# Patient Record
Sex: MALE | Race: WHITE | NOT HISPANIC OR LATINO | URBAN - METROPOLITAN AREA
[De-identification: names, ages, dates, MRNs, and addresses within clinical notes are randomized per-mention and may not be internally consistent; named-entity substitution may affect disease eponyms.]

---

## 2017-01-20 ENCOUNTER — ALLSCRIPTS OFFICE VISIT (OUTPATIENT)
Dept: OTHER | Facility: OTHER | Age: 72
End: 2017-01-20

## 2017-01-20 DIAGNOSIS — R73.9 HYPERGLYCEMIA: ICD-10-CM

## 2017-01-20 DIAGNOSIS — E55.9 VITAMIN D DEFICIENCY: ICD-10-CM

## 2017-01-20 DIAGNOSIS — E29.1 TESTICULAR HYPOFUNCTION: ICD-10-CM

## 2017-01-24 ENCOUNTER — LAB CONVERSION - ENCOUNTER (OUTPATIENT)
Dept: OTHER | Facility: OTHER | Age: 72
End: 2017-01-24

## 2017-01-24 LAB
25(OH)D3 SERPL-MCNC: 58 NG/ML (ref 30–100)
HBA1C MFR BLD HPLC: 5.3 % OF TOTAL HGB
TESTOSTERONE FREE (HISTORICAL): 8.6 PG/ML (ref 30–135)
TESTOSTERONE TOTAL (HISTORICAL): 148 NG/DL (ref 250–1100)

## 2017-01-26 ENCOUNTER — GENERIC CONVERSION - ENCOUNTER (OUTPATIENT)
Dept: OTHER | Facility: OTHER | Age: 72
End: 2017-01-26

## 2017-07-21 ENCOUNTER — ALLSCRIPTS OFFICE VISIT (OUTPATIENT)
Dept: OTHER | Facility: OTHER | Age: 72
End: 2017-07-21

## 2018-01-09 NOTE — RESULT NOTES
Message   Testosterone Free and Total is low but this was expected with no injections for one month  Verified Results  *(Q) VITAMIN D, 25-HYDROXY, LC/MS/MS 21Jan2017 10:04Jo Ann Nunn     Test Name Result Flag Reference   VITAMIN D, 25-OH, TOTAL 58 ng/mL     Vitamin D Status         25-OH Vitamin D:     Deficiency:                    <20 ng/mL  Insufficiency:             20 - 29 ng/mL  Optimal:                 > or = 30 ng/mL     For 25-OH Vitamin D testing on patients on   D2-supplementation and patients for whom quantitation   of D2 and D3 fractions is required, the QuestAssureD(TM)  25-OH VIT D, (D2,D3), LC/MS/MS is recommended: order   code 31561 (patients >2yrs)  For more information on this test, go to:  http://[employer]/faq/DDO539  (This link is being provided for   informational/educational purposes only )     (Q) TESTOSTERONE, FREE AND TOTAL, LC/MS/MS 21Jan2017 10:04Jo Ann uNnn     Test Name Result Flag Reference   TESTOSTERONE, TOTAL,$LC/MS/ ng/dL L 250-1100   Men with clinically significant hypogonadal  symptoms and testosterone values repeatedly in  the range of the 200-300 ng/dL or less, may  benefit from testosterone treatment after  adequate risk and benefits counseling  For more information on this test, go to  http://[employer]/faq/  TotalTestosteroneLCMSMS   FREE TESTOSTERONE 8 6 pg/mL L 30 0-135 0     (Q) HEMOGLOBIN A1c 21Jan2017 10:04AM Jo Ann Reed   REPORT COMMENT:  FASTING:YES     Test Name Result Flag Reference   HEMOGLOBIN A1c 5 3 % of total Hgb  <5 7   According to ADA guidelines, hemoglobin A1c <7 0%  represents optimal control in non-pregnant diabetic  patients  Different metrics may apply to specific  patient populations  Standards of Medical Care in  [phone]  Diabetes Care   2013;36:s11-s66     For the purpose of screening for the presence of  diabetes  <5 7%       Consistent with the absence of diabetes  5 7-6 4%    Consistent with increased risk for diabetes              (prediabetes)  >or=6 5%    Consistent with diabetes     This assay result is consistent with a decreased risk  of diabetes  Currently, no consensus exists for use of hemoglobin  A1c for diagnosis of diabetes for children

## 2018-01-11 NOTE — RESULT NOTES
Message   A1C 5 7- minimally elevated into prediabetes range  rest of labs oK  Continue current androgel and Vitamin D     Verified Results  (1) PSA (SCREEN) (Dx V76 44 Screen for Prostate Cancer) [id number] 01:15PM Morna      Test Name Result Flag Reference   PSA, TOTAL <0 1 ng/mL  < OR = 4 0   This test was performed using the Siemens  chemiluminescent method  Values obtained from  different assay methods cannot be used  interchangeably  PSA levels, regardless of  value, should not be interpreted as absolute  evidence of the presence or absence of disease  (Q) HEMOGLOBIN + HEMATOCRIT [id number] 01:15PM Morna [place]     Test Name Result Flag Reference   HEMOGLOBIN 14 6 g/dL  13 2-17 1   HEMATOCRIT 43 1 %  38 5-50 0     *(Q) VITAMIN D, 25-HYDROXY, LC/MS/MS [id number] 01:15PM Morna [place]     Test Name Result Flag Reference   VITAMIN D, 25-OH, TOTAL 53 ng/mL     Vitamin D Status         25-OH Vitamin D:     Deficiency:                    <20 ng/mL  Insufficiency:             20 - 29 ng/mL  Optimal:                 > or = 30 ng/mL     For 25-OH Vitamin D testing on patients on   D2-supplementation and patients for whom quantitation   of D2 and D3 fractions is required, the QuestAssureD(TM)  25-OH VIT D, (D2,D3), LC/MS/MS is recommended: order   code 22993 (patients >2yrs)  For more information on this test, go to:  http://education  [employer]faq/FMV952  (This link is being provided for   informational/educational purposes only )     (Q) TESTOSTERONE, FREE AND TOTAL, LC/MS/MS [id number] 01:15PM Morna      Test Name Result Flag Reference   TESTOSTERONE, TOTAL,$LC/MS/ ng/dL  250-1100   Men with clinically significant hypogonadal  symptoms and testosterone values repeatedly in  the range of the 200-300 ng/dL or less, may  benefit from testosterone treatment after  adequate risk and benefits counseling              For more information on this test, go to  http://education  [employer]/faq/  TotalTestosteroneLCMSMS   FREE TESTOSTERONE 30 3 pg/mL  30 0-135 0     (Q) HEMOGLOBIN A1c [id number] 01:15PM Elvin Arrington   REPORT COMMENT:  FASTING:YES     Test Name Result Flag Reference   HEMOGLOBIN A1c 5 7 % of total Hgb H <5 7   According to ADA guidelines, hemoglobin A1c <7 0%  represents optimal control in non-pregnant diabetic  patients  Different metrics may apply to specific  patient populations  Standards of Medical Care in    Diabetes Care  2013;36:s11-s66     For the purpose of screening for the presence of  diabetes  <5 7%       Consistent with the absence of diabetes  5 7-6 4%    Consistent with increased risk for diabetes              (prediabetes)  >or=6 5%    Consistent with diabetes     This assay result is consistent with an increased risk  of diabetes  Currently, no consensus exists for use of hemoglobin  A1c for diagnosis of diabetes for children         Signatures   Electronically signed by : MARIE Alfred; Mar 15 2016  1:50PM EST                       (Author)

## 2018-01-12 NOTE — MISCELLANEOUS
Provider Comments  Provider Comments:   PATIENT NO SHOWED FOR 7- APPT  Signatures   Electronically signed by :  Boy Lopez; Jul 21 2017  9:28AM EST                       (Author)

## 2018-01-12 NOTE — MISCELLANEOUS
Provider Comments  Provider Comments:   No show for 8/11 appointment  Signatures   Electronically signed by : Mickel Dakins, OM; Aug 11 2016 10:22AM EST                       (Author)    Electronically signed by : MARIE Colunga;  Aug 11 2016 10:28AM EST                       (Author)

## 2018-01-12 NOTE — PROGRESS NOTES
Assessment    1  Testicular hypogonadism (257 2) (E29 1)   2  Vitamin D deficiency (268 9) (E55 9)   3  Hyperglycemia (790 29) (R73 9)    Plan  Hyperglycemia    · (1) HEMOGLOBIN A1C; Status:Active; Requested LAURA:[id number];    Perform:[facility]; MRC:[id number]; Ordered;  For:Hyperglycemia; Ordered By:Kelsie Reed;  Testicular hypogonadism    · Anastrozole 1 MG Oral Tablet; TAKE 1 TABLET FIVE DAYS PER WEEK   Rx By: Josee Orr; Dispense: 120 Days ; #:1 X 60 Tablet Bottle; Refill: 1; For: Testicular hypogonadism; NEGIN = N; Faxed To: [facility]   · AndroGel Pump 20 25 MG/ACT (1 62%) Transdermal Gel; 2 squirts daily to skin   Rx By: Josee Orr; Dispense: 90 Days ; #:3 GM; Refill: 1; For: Testicular hypogonadism; NEGIN = N; Print Rx   · (1) TESTOSTERONE, FREE (DIRECT) AND TOTAL; Status:Active; Requested  VYX:[id number];    Perform:[facility]; XAS:32ZDS8379; Ordered; For:Testicular hypogonadism; Ordered By:Kelsie Reed;   · Follow-up visit in 6 months Evaluation and Treatment  Follow-up  Status: Complete   Done: 10BPC1052   Ordered; For: Testicular hypogonadism; Ordered By: Josee Orr Performed:  Due: 30QQN0977; Last Updated By: Daren De; 1/20/2017 9:54:08 AM   · Follow-Up With Advanced Practitioner Evaluation and Treatment  Follow-up  Status:  Complete  Done: 72ITG8470   Ordered; For: Testicular hypogonadism; Ordered By: Josee Orr Performed:  Due: 55OSN8608; Last Updated By: Daren De; 1/20/2017 9:54:17 AM  Vitamin D deficiency    · (1) VITAMIN D 25-HYDROXY; Status:Active; Requested TKW:62SLV1480;    Perform:[facility]; KVU:[id number]; Ordered; For:Vitamin D deficiency; Ordered By:Kelsie Reed;    Discussion/Summary  Discussion Summary:   1  Hypogonadism: He feels much better while using AndroGel and Anastrozole  Renewed both prescriptions today  Provided prescription coupon cards for AndroGel during visit   Check free and total testosterone  2  Hyperglycemia: With his recent weight loss and change in blood sugars we will check a hemoglobin A1c  He is currently not symptomatic, discussed the symptoms of hypo and hyperglycemia and he will contact the office if he becomes symptomatic  3  Vitamin D deficiency: Continue vitamin D supplementation  Check vitamin D level  Medication SE Review and Pt Understands Tx: Possible side effects of new medications were reviewed with the patient/guardian today  The treatment plan was reviewed with the patient/guardian  The patient/guardian understands and agrees with the treatment plan   Counseling Documentation With Imm: The patient was counseled regarding diagnostic results, instructions for management, risk factor reductions, patient and family education, impressions, risks and benefits of treatment options, importance of compliance with treatment  Chief Complaint  Chief Complaint Free Text Note Form: follow up      History of Present Illness  HPI: 58-year-old male presents in the office today for follow-up of testicular hypogonadism, hyperglycemia and vitamin D deficiency  He has a history of cirrhosis, hepatic encephalopathy and liver cancer with metastasis to the lymph nodes in his chest and abdomen  He is currently in treatment at the [address] [place] [address] where he has completed chemotherapy and is now being treated with Lana Mora  His prognosis is 1-2 years  For his testicular hypogonadism, he is taking AndroGel 2 pumps daily and anastrozole 1 tablet every 5 days per week  He ran out of his medications approximately 1 month ago and feels more fatigued and tired  He denies any urinary symptoms such as urgency, hesitancy or frequency and is currently being treated with tamsulosin  He sees his urologist annually  He has had high blood sugars in the past  His blood sugars are checked monthly at the [facility] as part of his treatment   His blood sugars were 130 to 150 until recently they have been in the [de-identified]  He has lost approximately 32 pounds in the past year  For his sleep apnea he utilizes a CPAP and awakens in the morning feeling refreshed  For his vitamin D deficiency he currently takes 50,000 iu of vitamin D weekly  Review of Systems  Endo Adult ROS Male Established v2 - [facility]:   Constitutional/General: no recent weight gain, recent weight loss, no poor energy/fatigue, no increased energy level, insomnia/sleep problems, no fever and no feeling weak  Heart: no high blood pressure, no chest pain/tightness, no rapid/racing heart rate and no palpitations  Genitourinary - Urinary urinating during the night, but no frequent urination and no excess urination  Eyes: no blurred vision, no double vision, no bulging eyes, no gritty/scratchy eyes and no excessive tearing  Mouth / Throat: no hoarseness and no difficulty swallowing  Neck: no lumps, no swollen glands, no neck pain, no neck stiffness and no enlarged thyroid  Respiratory: no wheezing, no asthma and no persistent cough  Musculoskeletal: no muscle aches/pain, no joint aches/pain and no muscle weakness  Skin & Hair: dry skin, no acne, the hair texture was not oily, no hair loss and no excessive hair growth  Gastrointestinal: no constipation, no diarrhea, no waking at night to drink and no stomach ache  Neurological: no blackouts, no weakness and no tremors  Genital: no testicular pain, no testicular lumps/bumps/mass and performs monthly testicular exam    Endocrine: no feeling hot frequently, no feeling cold frequently, no shifts between feeling hot and cold, no cold hands or feet, no excessive sweating, no thyroid problems, no blood sugar problems, no excessive thirst, excessive hunger, no change in shoe size, no nausea or vomiting and no shaky hands  ROS Reviewed:   ROS reviewed  Active Problems    1   Benign Localized Prostatic Hyperplasia Without Urinary Obstruction With Other Lower   Urinary Tract Symptoms (600 20)   2  Cirrhosis (571 5) (K74 60)   3  Erectile dysfunction of non-organic origin (302 72) (F52 21)   4  Hyperglycemia (790 29) (R73 9)   5  Obesity (278)   6  Obstructive sleep apnea (327 23) (G47 33)   7  Prediabetes (790 29) (R73 09)   8  Testicular hypogonadism (257 2) (E29 1)   9  Vitamin D deficiency (268 9) (E55 9)    Past Medical History    1  History of Cirrhosis (571 5) (K74 60)   2  History of Esophageal Varices (456 1)   3  History of Left Ventricular Thrombosis (410 90)   4  History of Pulmonary Embolism (V12 55)    Surgical History    1  History of Inguinal Hernia Repair  Surgical History Reviewed: The surgical history was reviewed and updated today  Family History  Father    1  Family history of Heart attack   2  Family history of Heart Disease (V17 49)   3  Family history of Hypertension (V17 49)   4  Family history of Reported Family History Of Heart Disease  Son    5  Family history of Reported Family History Of Kidney Disease  Sister    10  Family history of Cancer  Brother    7  Family history of Asthma (V17 5)   8  Family history of Diabetes Mellitus (V18 0)   9  Family history of Diabetes Mellitus (V18 0)   10  Family history of Diabetes Mellitus (V18 0)   11  Family history of Drug Dependence   12  Family history of Hay Fever  Family History Reviewed: The family history was reviewed and updated today  Social History    · Denied: History of Alcohol Use (History)   · Caffeine Use   · Denied: History of Drug Use   · Former smoker  Social History Reviewed: The social history was reviewed and updated today  Current Meds   1  AMILoride HCl - 5 MG Oral Tablet; Therapy: 39RNV1736 to (Last Rx:62Aog3110)  Requested for: 72ZXT7946 Ordered   2  Anastrozole 1 MG Oral Tablet; TAKE 1 TABLET FIVE DAYS PER WEEK; Therapy: 12CBS3599 to (Avi Zambrano)  Requested for: 21Mar2016; Last   Rx:21Mar2016 Ordered   3   AndroGel Pump 20 25 MG/ACT (1 62%) Transdermal Gel; 2 squirts daily to skin; Therapy: 29Rnp1571 to (Evaluate:53Ufw7569); Last Rx:54Nfu3177 Ordered   4  Colcrys 0 6 MG Oral Tablet; Therapy: 01GFE6834 to (Last Rx:71Rfe7023)  Requested for: 54LOR6273 Ordered   5  Feosol TABS; Therapy: (Kriss Ramirez) to Recorded   6  Furosemide 80 MG Oral Tablet; Therapy: (Recorded:37Brc4970) to Recorded   7  Abby 0 5-0 4 MG Oral Capsule; Therapy: 23XBJ3801 to (Evaluate:83Vtu5551) Recorded   8  Ketoconazole 2 % External Shampoo; Therapy: 11EEM8258 to (Evaluate:04Nov2014) Recorded   9  MetroNIDAZOLE 250 MG Oral Tablet; Therapy: 97FOY5748 to (Last Rx:75Fka3722)  Requested for: 26SAT8023 Ordered   10  Nadolol 40 MG Oral Tablet; Therapy: 80LAS4249 to (Last Rx:49Guw4514)  Requested for: 29ZVU0263 Ordered   11  NexAVAR 200 MG Oral Tablet; Therapy: 89Aoz7886 to (Janora Host) Recorded   12  Pantoprazole Sodium 40 MG Oral Tablet Delayed Release; Therapy: 45QAL1135 to (Last Rx:41Rih5906)  Requested for: 34FZP1894 Ordered   13  Tamsulosin HCl - 0 4 MG Oral Capsule; Therapy: 27Mmz8638 to (Last Rx:35Eqg8552)  Requested for: 38Pqi5080 Ordered   14  Vitamin D 45292 UNIT CAPS; Therapy: (Recorded:20Jun2013) to Recorded  Medication List Reviewed: The medication list was reviewed and updated today  Allergies    1  No Known Drug Allergies   2  No Known Drug Allergies    Vitals  Vital Signs    Recorded: 78GFK5415 09:07AM   Heart Rate 66   Systolic 884   Diastolic 68   Height 5 ft 10 in   Weight 229 lb    BMI Calculated 32 86   BSA Calculated 2 21     Physical Exam    Constitutional   General appearance: No acute distress, well appearing and well nourished  Eyes   Conjunctiva and lids: No swelling, erythema, or discharge  Pupils: Equal, round and reactive to light  The sclera are anicteric  Extraocular movements are intact      Ears, Nose, Mouth, and Throat   External inspection of ears, nose and lips: Normal     Oropharynx: Normal with no erythema, edema, exudate or lesions  Exam of Head: The head is atraumatic and normocephalic  Neck: The neck is supple  The thyroid is normal in size with no palpable nodules  Pulmonary   Respiratory effort: No increased work of breathing or signs of respiratory distress  Auscultation of lungs: Clear to auscultation bilaterally with normal chest expansion  Cardiovascular   Auscultation of heart: Normal rate and rhythm with no murmurs, gallops or rubs  Examination of extremities for edema and/or varicosities: Abnormal   Trace edema  Examination of pulses: Dorsalis pedal pulses are +2 and equal bilaterally  Examination of Carotids: No bruits  Abdomen   Abdomen: Abdomen is soft, non-tender with normal bowel sounds  Lymphatic   Palpation of lymph nodes: No supraclavicular or suboccipital lymphadenopathy  Musculoskeletal   Gait and station: Normal     Digits and nails: Abnormal   Brittle nails  Inspection/palpation of joints, bones, and muscles: Muscle bulk and tone is normal     Skin   Skin and subcutaneous tissue: Abnormal   Dry Skin  AK to scalp  Neurologic   Cranial nerves: Cranial nerves 2-12 intact  Reflexes: 2+ and symmetric  Sensation: No sensory loss  Motor Strength: Strength is 5/5 bilaterally  Psychiatric   Orientation to person, place and time: Normal     Mood and affect: Affect and attention span are normal     Diabetic Foot Exam   Patitent was not examined with shoes and socks off today         Results/Data  (Q) HEMOGLOBIN + HEMATOCRIT [id number] 01:15PM [facility]     Test Name Result Flag Reference   HEMOGLOBIN 14 6 g/dL  13 2-17 1   HEMATOCRIT 43 1 %  38 5-50 0     *(Q) VITAMIN D, 25-HYDROXY, LC/MS/MS [id number] 01:15PM [facility]     Test Name Result Flag Reference   VITAMIN D, 25-OH, TOTAL 53 ng/mL     Vitamin D Status         25-OH Vitamin D:     Deficiency:                    <20 ng/mL  Insufficiency:             20 - 29 ng/mL  Optimal:                 > or = 30 ng/mL     For 25-OH Vitamin D testing on patients on   D2-supplementation and patients for whom quantitation   of D2 and D3 fractions is required, the QuestAssureD(TM)  25-OH VIT D, (D2,D3), LC/MS/MS is recommended: order   code 00606 (patients >2yrs)  For more information on this test, go to:  http://[employer]/faq/DST167  (This link is being provided for   informational/educational purposes only )     (Q) TESTOSTERONE, FREE AND TOTAL, LC/MS/MS 64VHT5879 01:15PM [facility]     Test Name Result Flag Reference   TESTOSTERONE, TOTAL,$LC/MS/ ng/dL  250-1100   Men with clinically significant hypogonadal  symptoms and testosterone values repeatedly in  the range of the 200-300 ng/dL or less, may  benefit from testosterone treatment after  adequate risk and benefits counseling  For more information on this test, go to  http://[employer]/faq/  TotalTestosteroneLCMSMS   FREE TESTOSTERONE 30 3 pg/mL  30 0-135 0     (Q) HEMOGLOBIN A1c [id number] 01:15PM Elvin Arrington   REPORT COMMENT:  FASTING:YES     Test Name Result Flag Reference   HEMOGLOBIN A1c 5 7 % of total Hgb H <5 7   According to ADA guidelines, hemoglobin A1c <7 0%  represents optimal control in non-pregnant diabetic  patients  Different metrics may apply to specific  patient populations  Standards of Medical Care in    Diabetes Care  2013;36:s11-s66     For the purpose of screening for the presence of  diabetes  <5 7%       Consistent with the absence of diabetes  5 7-6 4%    Consistent with increased risk for diabetes              (prediabetes)  >or=6 5%    Consistent with diabetes     This assay result is consistent with an increased risk  of diabetes  Currently, no consensus exists for use of hemoglobin  A1c for diagnosis of diabetes for children       Future Appointments    Date/Time Provider Specialty Site   07/21/2017 09:15 AM Aneta Yee [facility]     Signatures   Electronically signed by : Aneta Ritter; Jan 20 2017 10:14AM EST                       (Author)

## 2018-01-14 VITALS
DIASTOLIC BLOOD PRESSURE: 68 MMHG | WEIGHT: 229 LBS | BODY MASS INDEX: 32.78 KG/M2 | HEIGHT: 70 IN | HEART RATE: 66 BPM | SYSTOLIC BLOOD PRESSURE: 120 MMHG